# Patient Record
Sex: FEMALE | Race: WHITE | URBAN - METROPOLITAN AREA
[De-identification: names, ages, dates, MRNs, and addresses within clinical notes are randomized per-mention and may not be internally consistent; named-entity substitution may affect disease eponyms.]

---

## 2017-07-04 ENCOUNTER — HOSPITAL ENCOUNTER (EMERGENCY)
Facility: CLINIC | Age: 22
Discharge: HOME | End: 2017-07-04
Attending: EMERGENCY MEDICINE | Admitting: EMERGENCY MEDICINE
Payer: COMMERCIAL

## 2017-07-04 VITALS
HEART RATE: 110 BPM | SYSTOLIC BLOOD PRESSURE: 103 MMHG | OXYGEN SATURATION: 100 % | DIASTOLIC BLOOD PRESSURE: 62 MMHG | RESPIRATION RATE: 18 BRPM | TEMPERATURE: 98.1 F

## 2017-07-04 DIAGNOSIS — R06.02 SHORTNESS OF BREATH: ICD-10-CM

## 2017-07-04 PROCEDURE — 96374 THER/PROPH/DIAG INJ IV PUSH: CPT | Performed by: EMERGENCY MEDICINE

## 2017-07-04 PROCEDURE — 25000128 H RX IP 250 OP 636: Performed by: EMERGENCY MEDICINE

## 2017-07-04 PROCEDURE — 99284 EMERGENCY DEPT VISIT MOD MDM: CPT | Mod: 25 | Performed by: EMERGENCY MEDICINE

## 2017-07-04 PROCEDURE — 93005 ELECTROCARDIOGRAM TRACING: CPT | Performed by: EMERGENCY MEDICINE

## 2017-07-04 PROCEDURE — 96361 HYDRATE IV INFUSION ADD-ON: CPT | Performed by: EMERGENCY MEDICINE

## 2017-07-04 PROCEDURE — 93010 ELECTROCARDIOGRAM REPORT: CPT | Mod: Z6 | Performed by: EMERGENCY MEDICINE

## 2017-07-04 RX ORDER — HYDROXYZINE HYDROCHLORIDE 50 MG/1
50 TABLET, FILM COATED ORAL 3 TIMES DAILY
Qty: 9 TABLET | Refills: 0 | Status: SHIPPED | OUTPATIENT
Start: 2017-07-04 | End: 2017-07-07

## 2017-07-04 RX ORDER — DIPHENHYDRAMINE HYDROCHLORIDE 50 MG/ML
25 INJECTION INTRAMUSCULAR; INTRAVENOUS ONCE
Status: COMPLETED | OUTPATIENT
Start: 2017-07-04 | End: 2017-07-04

## 2017-07-04 RX ADMIN — DIPHENHYDRAMINE HYDROCHLORIDE 25 MG: 50 INJECTION, SOLUTION INTRAMUSCULAR; INTRAVENOUS at 18:30

## 2017-07-04 RX ADMIN — SODIUM CHLORIDE 1000 ML: 9 INJECTION, SOLUTION INTRAVENOUS at 18:30

## 2017-07-04 NOTE — ED AVS SNAPSHOT
[facility], Emergency Department    [address]    [place] [address]    Phone:  [phone]    Fax:  [phone]                                       Jenny Carter   MRN: [id number]    Department:  [facility], Emergency Department   Date of Visit:  7/4/2017           Patient Information     Date Of Birth          1995        Your diagnoses for this visit were:     Shortness of breath transient       You were seen by Jun Avalos MD.        Discharge Instructions       Please make an appointment to follow up with Your Primary Care Provider in 1-2 weeks for recheck and possible allergy testing vs. holter monitor testing.    Return to the ER for worsening.    Discharge References/Attachments     PALPITATIONS (ENGLISH)    ALLERGIC REACTION, OTHER (GENERAL) (ENGLISH)      24 Hour Appointment Hotline       To make an appointment at any [facility], call [phone] ([phone]). If you don't have a family doctor or clinic, we will help you find one. [facility] clinics are conveniently located to serve the needs of you and your family.             Review of your medicines      START taking        Dose / Directions Last dose taken    hydrOXYzine 50 MG tablet   Commonly known as:  ATARAX   Dose:  50 mg   Quantity:  9 tablet        Take 1 tablet (50 mg) by mouth 3 times daily for 3 days   Refills:  0        ranitidine 150 MG tablet   Commonly known as:  ZANTAC   Dose:  150 mg   Quantity:  10 tablet        Take 1 tablet (150 mg) by mouth 2 times daily for 5 days   Refills:  0                Prescriptions were sent or printed at these locations (2 Prescriptions)                   Other Prescriptions                Printed at Department/Unit printer (2 of 2)         hydrOXYzine (ATARAX) 50 MG tablet               ranitidine (ZANTAC) 150 MG tablet                Procedures and tests performed during your visit     Cardiac Continuous Monitoring    EKG 12-lead, tracing only     "Peripheral IV catheter    Pulse oximetry nursing      Orders Needing Specimen Collection     None      Pending Results     No orders found from 2017 to 2017.            Pending Culture Results     No orders found from 2017 to 2017.            Pending Results Instructions     If you had any lab results that were not finalized at the time of your Discharge, you can call the ED Lab Result RN at [phone]. You will be contacted by this team for any positive Lab results or changes in treatment. The nurses are available 7 days a week from 10A to 6:30P.  You can leave a message 24 hours per day and they will return your call.        Thank you for choosing [facility]       Thank you for choosing [facility] for your care. Our goal is always to provide you with excellent care. Hearing back from our patients is one way we can continue to improve our services. Please take a few minutes to complete the written survey that you may receive in the mail after you visit with us. Thank you!        [employer] Information     [employer] lets you send messages to your doctor, view your test results, renew your prescriptions, schedule appointments and more. To sign up, go to [email or web address] . Click on \"Log in\" on the left side of the screen, which will take you to the Welcome page. Then click on \"Sign up Now\" on the right side of the page.     You will be asked to enter the access code listed below, as well as some personal information. Please follow the directions to create your username and password.     Your access code is: [email or web address]  Expires: 10/2/2017  7:39 PM     Your access code will  in 90 days. If you need help or a new code, please call your [facility] clinic or [phone].        Care EveryWhere ID     This is your Care EveryWhere ID. This could be used by other organizations to access your [facility] medical records  [id number]        Equal Access to Services     MICHELLE RODRÍGUEZ AH: Eber renae " Blanca, alireza ruffin, lissettgómez kamarelois deng, angelia bryant. So [facility] [phone].    ATENCIÓN: Si habla español, tiene a buckley disposición servicios gratuitos de asistencia lingüística. Llame al [phone].    We comply with applicable federal civil rights laws and [place] laws. We do not discriminate on the basis of race, color, national origin, age, disability sex, sexual orientation or gender identity.            After Visit Summary       This is your record. Keep this with you and show to your community pharmacist(s) and doctor(s) at your next visit.

## 2017-07-04 NOTE — ED AVS SNAPSHOT
[facility], Emergency Department    [address]    [place] [address]    Phone:  [phone]    Fax:  [phone]                                       Jenny Carter   MRN: [id number]    Department:  [facility], Emergency Department   Date of Visit:  7/4/2017           After Visit Summary Signature Page     I have received my discharge instructions, and my questions have been answered. I have discussed any challenges I see with this plan with the nurse or doctor.    ..........................................................................................................................................  Patient/Patient Representative Signature      ..........................................................................................................................................  Patient Representative Print Name and Relationship to Patient    ..................................................               ................................................  Date                                            Time    ..........................................................................................................................................  Reviewed by Signature/Title    ...................................................              ..............................................  Date                                                            Time

## 2017-07-04 NOTE — ED NOTES
"Pt states she ate dinner at 1730, started feeling tightness and swelling in her face and down her throat. Pt states she also felt like her heart was racing. Pt took an oral benadryl at 1805, vomited at 1810, and then came to the ED. After receiving IV benadryl in the ED pt feels \"a different shortness of breath\".   "

## 2017-07-04 NOTE — ED PROVIDER NOTES
History     Chief Complaint   Patient presents with     Allergic Reaction     just had  dinner; tightness in chest; rash; airway intact, flushing; rapid pulse     HPI  Jenny Carter is a 21 year old female who was eating dinner at home tonight.  Patient states she was having a pasta dish, when all of a sudden she started to feel some tightness in her chest with some shortness of breath.  Patient states that she felt like her throat was closing and that she was having an allergic reaction.  Patient states that she took some Benadryl and shortly thereafter proceeded to have one episode of emesis.  Patient is brought to the ER for evaluation.  Patient has not had previous episodes like this and states that during the episode heart was racing.  Upon arrival to triage patient s heart rate was 110 and she said that her heart was going just slightly faster than that at home.  Patient has no history of asthma, no history of diabetes and no previous known food allergies.    This part of the medical record was transcribed by Roberto Kwok Medical Scribe, from a dictation done by Jun Avalos MD.       I have reviewed the Medications, Allergies, Past Medical and Surgical History, and Social History in the [facility] system.  PAST MEDICAL HISTORY: History reviewed. No pertinent past medical history.    PAST SURGICAL HISTORY: History reviewed. No pertinent surgical history.    FAMILY HISTORY: No family history on file.    SOCIAL HISTORY:   Social History   Substance Use Topics     Smoking status: Never Smoker     Smokeless tobacco: Not on file     Alcohol use No       Previous Medications    No medications on file     No Known Allergies      Review of Systems   All other systems reviewed and are negative.      Physical Exam   BP: (!) 123/97  Pulse: 110  Temp: 98.1  F (36.7  C)  Resp: 16  SpO2: 100 %  Physical Exam   Constitutional: She is oriented to person, place, and time.   Mild distress secondary to her shortness  of breath   HENT:   Head: Atraumatic.   No oropharyngeal swelling   Eyes: EOM are normal. Pupils are equal, round, and reactive to light.   Neck: Neck supple.   Cardiovascular: Exam reveals no friction rub.    No murmur heard.  Slightly fast but regular   Pulmonary/Chest: Breath sounds normal. She has no wheezes. She has no rales.   Abdominal: Soft. There is no tenderness.   Musculoskeletal: She exhibits no edema or tenderness.   Neurological: She is alert and oriented to person, place, and time.   Grossly intact and symmetric   Skin: Skin is warm. No rash noted.   Psychiatric:   Slightly anxious       ED Course     ED Course     Procedures        Patient was placed on cardiac monitor and oximetry.  An IV was established and the patient was given 1 L of IV fluids along with some Benadryl intravenously.    EKG revealed a normal sinus rhythm at a rate of 98 with a RI of 0.152 and a QRS duration of 0.082.  The patient had a normal axis with no acute ST or T-wave changes for ischemia.  This was reviewed by me personally.      Assessments & Plan (with Medical Decision Making)     I have reviewed the nursing notes.    Patient improved after one hour with the IV fluids and Benadryl intravenously.  With no obvious rash or oropharyngeal swelling.  Differential diagnosis includes tachyarrhythmia, hyperventilation/panic episode, or allergic reaction.  At this time I do not think there is any cardiopulmonary emergency requiring hospitalization or further observation.    I have reviewed the findings, diagnosis, plan and need for follow up with the patient.    New Prescriptions    HYDROXYZINE (ATARAX) 50 MG TABLET    Take 1 tablet (50 mg) by mouth 3 times daily for 3 days    RANITIDINE (ZANTAC) 150 MG TABLET    Take 1 tablet (150 mg) by mouth 2 times daily for 5 days       Final diagnoses:   Shortness of breath - transient     Please make an appointment to follow up with Your Primary Care Provider in 1-2 weeks for recheck and  possible allergy testing vs. holter monitor testing.    Return to the ER for worsening.    Routine discharge instructions are given for the diagnosis above.    Jun Avalos MD    7/4/2017   [facility], [place], EMERGENCY DEPARTMENT     Jun Avalos MD  07/04/17 1947

## 2017-07-05 LAB — INTERPRETATION ECG - MUSE: NORMAL

## 2017-07-05 NOTE — DISCHARGE INSTRUCTIONS
Please make an appointment to follow up with Your Primary Care Provider in 1-2 weeks for recheck and possible allergy testing vs. holter monitor testing.    Return to the ER for worsening.